# Patient Record
Sex: MALE | Race: WHITE | NOT HISPANIC OR LATINO
[De-identification: names, ages, dates, MRNs, and addresses within clinical notes are randomized per-mention and may not be internally consistent; named-entity substitution may affect disease eponyms.]

---

## 2022-03-29 PROBLEM — Z00.00 ENCOUNTER FOR PREVENTIVE HEALTH EXAMINATION: Status: ACTIVE | Noted: 2022-03-29

## 2022-04-27 ENCOUNTER — NON-APPOINTMENT (OUTPATIENT)
Age: 57
End: 2022-04-27

## 2022-04-27 ENCOUNTER — APPOINTMENT (OUTPATIENT)
Dept: HEART AND VASCULAR | Facility: CLINIC | Age: 57
End: 2022-04-27
Payer: MEDICARE

## 2022-04-27 VITALS
OXYGEN SATURATION: 96 % | BODY MASS INDEX: 22.36 KG/M2 | WEIGHT: 150.99 LBS | SYSTOLIC BLOOD PRESSURE: 135 MMHG | DIASTOLIC BLOOD PRESSURE: 77 MMHG | HEART RATE: 70 BPM | HEIGHT: 69 IN

## 2022-04-27 DIAGNOSIS — Z83.6 FAMILY HISTORY OF OTHER DISEASES OF THE RESPIRATORY SYSTEM: ICD-10-CM

## 2022-04-27 DIAGNOSIS — F17.210 NICOTINE DEPENDENCE, CIGARETTES, UNCOMPLICATED: ICD-10-CM

## 2022-04-27 DIAGNOSIS — Z78.9 OTHER SPECIFIED HEALTH STATUS: ICD-10-CM

## 2022-04-27 PROCEDURE — 99203 OFFICE O/P NEW LOW 30 MIN: CPT

## 2022-04-27 PROCEDURE — 93000 ELECTROCARDIOGRAM COMPLETE: CPT

## 2022-04-27 NOTE — HISTORY OF PRESENT ILLNESS
[FreeTextEntry1] : Pain- Rudy Holland [phone]\par PT- Jag 1\par ENT-\par GI- Dr Rajat Ahn colonoscopy 2019\par PCP- Dr Anand Trejo\par - Dr Fiscehr for PSA of 13\par Pod- lori Mckeon\par Ophtho  Dr Proctor

## 2022-04-27 NOTE — ASSESSMENT
[FreeTextEntry1] : Smoking- Discussed\par \par SOSA- Pt says all testing done at [facility] and Dr Trejo, not clear what was done but I asked pt to sign a release and get me the records.  Otherwise I will be conservative

## 2022-04-27 NOTE — REVIEW OF SYSTEMS
[Hearing Loss] : hearing loss [Vertigo] : vertigo [Negative] : Heme/Lymph [Headache] : headache [FreeTextEntry9] : Back Pain

## 2022-04-27 NOTE — REASON FOR VISIT
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  Labs done with Dr Anand Trejo   Pt notes SOB/SOSA. He has no CP.  " I PACE MYSELF",  very vague. When asked how much he smokes, he replied, "I don't know".  Pt wants to know what preventive things he can do.  \par \par EKG: NSR, normal axis and intervals, no ST-Tw abnormalities. 4/27/22

## 2022-06-10 ENCOUNTER — APPOINTMENT (OUTPATIENT)
Dept: OTOLARYNGOLOGY | Facility: CLINIC | Age: 57
End: 2022-06-10
Payer: MEDICARE

## 2022-06-10 VITALS — WEIGHT: 148 LBS | BODY MASS INDEX: 21.92 KG/M2 | HEIGHT: 69 IN | TEMPERATURE: 98.2 F

## 2022-06-10 DIAGNOSIS — J34.2 DEVIATED NASAL SEPTUM: ICD-10-CM

## 2022-06-10 DIAGNOSIS — J31.0 CHRONIC RHINITIS: ICD-10-CM

## 2022-06-10 PROCEDURE — 31231 NASAL ENDOSCOPY DX: CPT

## 2022-06-10 PROCEDURE — 99204 OFFICE O/P NEW MOD 45 MIN: CPT | Mod: 25

## 2022-06-10 NOTE — CONSULT LETTER
[Dear  ___] : Dear  [unfilled], [Consult Letter:] : I had the pleasure of evaluating your patient, [unfilled]. [Please see my note below.] : Please see my note below. [Consult Closing:] : Thank you very much for allowing me to participate in the care of this patient.  If you have any questions, please do not hesitate to contact me. [Sincerely,] : Sincerely, [FreeTextEntry3] : Cally Velasquez MD\par

## 2022-06-10 NOTE — HISTORY OF PRESENT ILLNESS
[de-identified] : ROME LORENZANA JR is a 56 year old patient with a long history of chronic sinus disease.  He said that he has frequent sinus headaches.  They occur over the midface and forehead.  He also has nasal obstruction and congestion which is worse on the right side.  It is worse during during certain seasons.  If there is pollen or humidity he feels worse.  He is not sure if he gets recurrent infections.  He saw an ENT about 2 months ago.  He said he was given Flonase but does not had improvement.  He is not sure if he has had a CT scan of the sinuses but he may have.\par \par No history of nasal or sinus surgery\par He may have had nasal trauma in the past but is not sure if he has had a fracture\par He does not have a pet but is around dogs.  He has not noticed any worsening symptoms.\par He is a smoker\par He had an MRI of the brain for headaches in 2020.  There was a small mucous retention cyst along the right sphenoid sinus and minimal inflammatory mucosal thickening in left sphenoid sinus per the report.  The remainder of the sinuses were clear

## 2022-06-10 NOTE — ASSESSMENT
[FreeTextEntry1] : He has a long history of chronic rhinosinusitis.  He has bilateral nasal congestion and obstruction.  He also suffers from headaches.  On exam, he has nasal mucosal edema, narrow nasal passages, inferior turbinate hypertrophy and a deviated septum.  He likely also has allergies.  It is unclear if the headaches are due to a sinus disease.  An MRI in 2020 did not show significant sinus findings.\par \par Plan\par -Findings and management options were discussed with the patient.\par -Nasal saline irrigations, nasal steroid spray, and antihistamines as needed.  I gave him Astelin nasal spray to try\par -Allergy evaluation recommended\par -Smoking cessation recommended\par -Neurology follow-up recommended for evaluation for the headaches\par -He could consider nasal procedures to improve his breathing.  However, it is unlikely they will help with the headaches\par -Follow-up after the evaluations.  He is also going to check to see if he has had a CT scan of the sinuses performed.  If he has not, we will send him for a scan\par -Call and return urgently if he has any worsening symptoms or concerns

## 2022-07-29 ENCOUNTER — APPOINTMENT (OUTPATIENT)
Dept: NEUROLOGY | Facility: CLINIC | Age: 57
End: 2022-07-29

## 2022-07-29 VITALS
WEIGHT: 152 LBS | HEIGHT: 69 IN | SYSTOLIC BLOOD PRESSURE: 113 MMHG | TEMPERATURE: 97.8 F | DIASTOLIC BLOOD PRESSURE: 76 MMHG | BODY MASS INDEX: 22.51 KG/M2 | OXYGEN SATURATION: 96 % | HEART RATE: 65 BPM

## 2022-07-29 DIAGNOSIS — Z87.09 PERSONAL HISTORY OF OTHER DISEASES OF THE RESPIRATORY SYSTEM: ICD-10-CM

## 2022-07-29 DIAGNOSIS — G89.29 HEADACHE, UNSPECIFIED: ICD-10-CM

## 2022-07-29 DIAGNOSIS — R51.9 HEADACHE, UNSPECIFIED: ICD-10-CM

## 2022-07-29 PROCEDURE — 99204 OFFICE O/P NEW MOD 45 MIN: CPT

## 2022-07-29 NOTE — ASSESSMENT
[FreeTextEntry1] : Pt is a 55yo M with PMH anxiety who presents for evaluation of chronic headaches x30 years which he describes as "sinus headaches" and dull aching bifrontal pressure. He has never taken any medication including OTC to treat his headaches. Etiologoy of headaches is unclear but likely due to chronic sinusitis/ allergies/ structural nasal issues described by Dr. Velasquez coupled by prior environmental exposures and previous concussion 30 years ago... MRI in 2020 was unremarkable and his headaches have not changed in quality since. He only takes nasal spray and sinus medication which helps. For now I recommend he continue to treat chronic sinus issues with ENT. For his headaches, he should try OTC such as Excedrin or extra strength Tylenol. Discussed strategies for treating chronic headaches- 1) to take his OTC medication at start of headache to ensure max efficacy  2) to avoid known triggers when able 3) Stay well-hydrated. I have advised him to keep a headache diary to better understand headache patterns. This should include details such as alleviating and precipitating factors, associated symptoms, frequency and severity, etc. We will set up followup visit in 1 month to review headache diary and to discuss responsiveness to OTC medications. If OTCs are ineffective he may benefit from triptan/ CGRP. for rescue. No need for preventative as he only gets about 4 headaches per month.

## 2022-07-29 NOTE — PHYSICAL EXAM
[FreeTextEntry1] : Mental status: Unkempt, attention/ concentration is poor- tangential. The patient is alert and oriented x3, naming intact x3, repetition normal. He can follow three-step commands. \par Speech is clear and fluent with good repetition, comprehension, and naming. No evidence of dysarthria.\par Memory is intact: Immediate recall 3 out of 3, short-term 3 out of 3, remote memory intact. \par \par Cranial nerves II through XII intact: \par CN II: Visual fields are full to confrontation.Pupils are 3 mm and briskly reactive to light. \par CN III, IV, VI: At primary gaze, there is no eye deviation. EOMI. No ptosis\par CN V: Facial sensation is intact to pinprick in all 3 divisions bilaterally. \par CN VII: Face is symmetric with normal eye closure and smile.\par CN VII: Hearing is normal to rubbing fingers\par CN IX, X: Palate elevates symmetrically. Phonation is normal.\par CN XI: Head turning and shoulder shrug are intact\par CN XII: Tongue is midline with normal movements and no atrophy.\par \par Motor exam: Upper and lower extremities 5 out of 5 power, normal muscle tone and bulk. No abnormal movements noted.\par Sensory exam: Light touch, vibration, position sense, and pinprick are intact in fingers and toes. Romberg absent.\par Coordination and vestibular exam: Finger to nose intact, no evidence of truncal or appendicular ataxia. No evidence of nystagmus. No vestibular symptoms elicited with head turning during ambulation.\par Gait: ANtalgic gait (L knee pain). Normal stance. Posture is normal. Gait is steady.  \par Reflexes: One to 2+ in upper and lower extremities. No pathological reflexes. Downgoing toes.\par \par \par

## 2022-07-29 NOTE — HISTORY OF PRESENT ILLNESS
[FreeTextEntry1] : Pt is a 56yoM with a hx of of chronic sinus disease, anxiety, no other medical issues, who presents today for initial evaluation of his headaches. He reports a hx of headaches x30 years. They began in 1990 after hitting his head while working- had head CT which was normal. He says that during his career working for [employer] he was exposed to many fumes and gases which he feels further stimulated more headaches. He has had intermittent headaches that he calls sinus headaches. They occur over the midface and forehead. Headaches occur about 4x per month. They usually begin with the feeling of nasal and sinus congestion. Headaches usually begin later in the day. They can last for up to a day. Pain feels like a pressure. Triggers include humidity/ heat, pollen, smell of gasoline, bright lights. Pain feels like a dull pressure. He had an MRI of the brain for headaches in 2020. There was a small mucous retention cyst along the right sphenoid sinus and minimal inflammatory mucosal thickening in left sphenoid sinus per the report. His headaches have not changed in quality or severity since they began 30 years ago. He denies any associated symptoms with his headaches including dizziness/ vision changes/ speech impairment/ focal or generalized weakness/ sensation changes. He has never taken any OTC medications including Excedrin/ Tylenol/ Motrin/ other because "I didn’'t know what I should be taking." \par  \par He is seeking care now because he is now unemployed and has more time to focus on his health. \par \par PMH- denies \par Hx PE/DVT/ other clot- denies \par PSH- denies \par Medications- prilosec, nasal spray, pepcid (previously on multiple medication for anxiety but those were d/c). He does want to take too many medications \par He is a current every day smoker- half a pack a day, for "many years." \par Recreational drug use- denies \par ETOH- denies \par \par

## 2022-10-20 ENCOUNTER — APPOINTMENT (OUTPATIENT)
Dept: NEUROLOGY | Facility: CLINIC | Age: 57
End: 2022-10-20

## 2022-10-20 VITALS
HEIGHT: 69 IN | DIASTOLIC BLOOD PRESSURE: 72 MMHG | TEMPERATURE: 95.6 F | BODY MASS INDEX: 22.96 KG/M2 | SYSTOLIC BLOOD PRESSURE: 119 MMHG | OXYGEN SATURATION: 97 % | HEART RATE: 100 BPM | WEIGHT: 155 LBS

## 2022-10-20 PROCEDURE — 99215 OFFICE O/P EST HI 40 MIN: CPT

## 2022-10-20 NOTE — HISTORY OF PRESENT ILLNESS
[FreeTextEntry1] : The pateint is a 57 year old male with a PMH of anxiety, GERD, BPH, chronic sinusitis, and head trauma. He returns for evaluation of headaches.\par \par The patient was last seen by CHERI Ferrer in July 2022.  At that time, he described chronic headaches that had been unchanged for over 30 years and only occurred 4 times per month.  He attributed the headaches to nasal congestion and pressure.  Given the infrequency, stable nature of the headaches, and normal exam, no further evaluation was recommended.  He was recommended to use over-the-counter analgesics as needed.\par \par Since his follow-up, he states he is now having severe headaches 4 times per week.  In addition, he has less severe headaches on other days.  He describes these headaches as a bitemporal pressure sensation.  Symptoms can last an entire day.  When he gets severe headaches, he becomes nauseous and has sensitivity to light, sound.  He typically needs to closes eyes and rest for relief.  He has been taking Excedrin perhaps twice per week for these headaches without significant relief.  Triggers seem to include certain foods, poor sleep, and stress.  He believes his father may have suffered from migraines.\par \par Otherwise, the patient has a long-standing RUE tremor. He describes chronic imbalance but attributes this to spinal disease. He denies bradykinesia or gait changes otherwise.

## 2022-10-20 NOTE — PHYSICAL EXAM
[FreeTextEntry1] : Alert.  Fully oriented.  Speech and language are intact. Very tangential. Cranial nerves II-XII are intact.  Motor exam reveals intact strength with individual muscle testing in bilateral upper and lower extremities.  R UE rest tremor, intermittent. Tone is normal.  Reflexes are normal.  Sensation is intact to light touch in distal extremities.  Finger-to-nose and heel-to-shin are intact.  Rapid alternating movements are normal in the upper and lower extremities.  Gait is normal. \par

## 2022-10-20 NOTE — ASSESSMENT
[FreeTextEntry1] : The pateint is a 57 year old male with a PMH of anxiety, GERD, BPH, chronic sinusitis, and head trauma. He returns for evaluation of headaches which seem to have worsened since his last visit. Will obtain MRI brain and start nortriptyline at nighttime, gradually increasing dose to 50 mg QHS over several weeks. PRN Imitrex also sent as abortive therapy (no history of stroke, etc). Recommend he continue to use OTC analgesics infrequently. Otherwise, he wishes to monitor tremor for now- could be consistent with parkinsonism - MRI will also eval for underlying structural causes.  RTC 3 months.\par

## 2023-01-04 ENCOUNTER — APPOINTMENT (OUTPATIENT)
Dept: NEUROLOGY | Facility: CLINIC | Age: 58
End: 2023-01-04
Payer: MEDICARE

## 2023-01-04 VITALS
SYSTOLIC BLOOD PRESSURE: 104 MMHG | HEART RATE: 92 BPM | BODY MASS INDEX: 23.4 KG/M2 | OXYGEN SATURATION: 96 % | TEMPERATURE: 97.9 F | WEIGHT: 158 LBS | HEIGHT: 69 IN | DIASTOLIC BLOOD PRESSURE: 74 MMHG

## 2023-01-04 DIAGNOSIS — G43.009 MIGRAINE W/OUT AURA, NOT INTRACTABLE, W/OUT STATUS MIGRAINOSUS: ICD-10-CM

## 2023-01-04 PROCEDURE — 99213 OFFICE O/P EST LOW 20 MIN: CPT

## 2023-01-04 NOTE — HISTORY OF PRESENT ILLNESS
[FreeTextEntry1] : The patient is a 57 year old male with a PMH of anxiety, GERD, BPH, chronic sinusitis, and head trauma. He returns for evaluation of headaches. \par \par Since our last visit, the patient reports frontal "migraines" with associated nausea, photophobia. He takes Excedrin migraine 2x per week with relief. He did not tolerating the nortriptyline. He has not tried Imitrex. His MRI was unremarkable. He denies tremor. He has no new complaints. He is currently undergoing eval with ophthalmology for eye irritation and urology for elevated PSA.

## 2023-01-04 NOTE — PHYSICAL EXAM
[FreeTextEntry1] : Alert. Extremely tangential.Speech/language intact. CN grossly intact. Moving all limbs symmetrically. Gait is normal.\par

## 2023-01-04 NOTE — ASSESSMENT
[FreeTextEntry1] : The patient is a 57 year old male with a PMH of anxiety, GERD, BPH, chronic sinusitis, and head trauma. He returns for evaluation of headaches. His migraines have lessened in frequency since our last visit. He will continue Excedrin sparingly for severe migraines. He also can try Imitrex if needed for refractory headaches.  RTC 6 months.

## 2023-12-14 ENCOUNTER — APPOINTMENT (OUTPATIENT)
Dept: HEART AND VASCULAR | Facility: CLINIC | Age: 58
End: 2023-12-14
Payer: MEDICARE

## 2023-12-14 ENCOUNTER — NON-APPOINTMENT (OUTPATIENT)
Age: 58
End: 2023-12-14

## 2023-12-14 VITALS
OXYGEN SATURATION: 95 % | BODY MASS INDEX: 23.11 KG/M2 | SYSTOLIC BLOOD PRESSURE: 119 MMHG | TEMPERATURE: 98.9 F | DIASTOLIC BLOOD PRESSURE: 76 MMHG | WEIGHT: 156 LBS | HEART RATE: 104 BPM | HEIGHT: 69 IN

## 2023-12-14 PROCEDURE — 99213 OFFICE O/P EST LOW 20 MIN: CPT

## 2023-12-14 PROCEDURE — 93000 ELECTROCARDIOGRAM COMPLETE: CPT

## 2023-12-14 NOTE — HISTORY OF PRESENT ILLNESS
[FreeTextEntry1] : Pain- Rudy Holland [phone] PT- Jag 1 ENT- GI- Dr Rajat Ahn colonoscopy 2019 PCP- Dr Anand Trejo - Dr Fischer for PSA of 13 Pod- lori Mckeon Dr  Sister Maricarmen Killian [phone]

## 2023-12-14 NOTE — REVIEW OF SYSTEMS
[Headache] : headache [Hearing Loss] : hearing loss [Vertigo] : vertigo [Negative] : Heme/Lymph [FreeTextEntry9] : Back Pain

## 2023-12-14 NOTE — REASON FOR VISIT
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  Labs done with Dr Anand Trejo   Pt notes SOB/SOSA. He has no CP.  " I PACE MYSELF",  very vague. When asked how much he smokes, he replied, "I don't know".  Pt wants to know what preventive things he can do.    12/14/23  Saw Dr Cerda for migraine, CT done.  B/L wrist, foot and shoulder pain. Mild wrist, moderate foot and shoulder arthrosis.  Moderate DJD of lumbar spine.  Parents deased, has 1 sister and her family  EKG: NSR, normal axis and intervals, no ST-Tw abnormalities. 4/27/22

## 2023-12-14 NOTE — ASSESSMENT
[FreeTextEntry1] : SOSA- Pt says all testing done at [facility] and Dr Trejo, not clear what was done but I asked pt to sign a release and get me the records. Otherwise I will be conservative. CT 2019, 2020 with CAC, mild emphysema and diffuse airway dz.  No cardiac testing!   CAC on CT from 2019 and 2020, will order a CCS.  Labs were drawn today in Office. Smoking discussed.  Smoking- Discussed

## 2023-12-18 LAB
ALBUMIN SERPL ELPH-MCNC: 4.7 G/DL
ALP BLD-CCNC: 76 U/L
ALT SERPL-CCNC: 10 U/L
ANION GAP SERPL CALC-SCNC: 11 MMOL/L
AST SERPL-CCNC: 17 U/L
BASOPHILS # BLD AUTO: 0.05 K/UL
BASOPHILS NFR BLD AUTO: 0.6 %
BILIRUB SERPL-MCNC: 0.4 MG/DL
BUN SERPL-MCNC: 15 MG/DL
CALCIUM SERPL-MCNC: 10 MG/DL
CHLORIDE SERPL-SCNC: 104 MMOL/L
CHOLEST SERPL-MCNC: 203 MG/DL
CO2 SERPL-SCNC: 25 MMOL/L
CREAT SERPL-MCNC: 0.92 MG/DL
EGFR: 95 ML/MIN/1.73M2
EOSINOPHIL # BLD AUTO: 0.31 K/UL
EOSINOPHIL NFR BLD AUTO: 3.6 %
ESTIMATED AVERAGE GLUCOSE: 123 MG/DL
GLUCOSE SERPL-MCNC: 104 MG/DL
HBA1C MFR BLD HPLC: 5.9 %
HCT VFR BLD CALC: 47 %
HDLC SERPL-MCNC: 45 MG/DL
HGB BLD-MCNC: 15.6 G/DL
IMM GRANULOCYTES NFR BLD AUTO: 0.3 %
LDLC SERPL CALC-MCNC: 116 MG/DL
LYMPHOCYTES # BLD AUTO: 1.98 K/UL
LYMPHOCYTES NFR BLD AUTO: 22.9 %
MAN DIFF?: NORMAL
MCHC RBC-ENTMCNC: 29.7 PG
MCHC RBC-ENTMCNC: 33.2 GM/DL
MCV RBC AUTO: 89.5 FL
MONOCYTES # BLD AUTO: 0.59 K/UL
MONOCYTES NFR BLD AUTO: 6.8 %
NEUTROPHILS # BLD AUTO: 5.69 K/UL
NEUTROPHILS NFR BLD AUTO: 65.8 %
NONHDLC SERPL-MCNC: 158 MG/DL
PLATELET # BLD AUTO: 280 K/UL
POTASSIUM SERPL-SCNC: 4.1 MMOL/L
PROT SERPL-MCNC: 6.9 G/DL
RBC # BLD: 5.25 M/UL
RBC # FLD: 12.5 %
SODIUM SERPL-SCNC: 140 MMOL/L
TRIGL SERPL-MCNC: 243 MG/DL
WBC # FLD AUTO: 8.65 K/UL

## 2023-12-20 ENCOUNTER — OUTPATIENT (OUTPATIENT)
Dept: OUTPATIENT SERVICES | Facility: HOSPITAL | Age: 58
LOS: 1 days | End: 2023-12-20
Payer: SELF-PAY

## 2023-12-20 ENCOUNTER — APPOINTMENT (OUTPATIENT)
Dept: CT IMAGING | Facility: HOSPITAL | Age: 58
End: 2023-12-20

## 2023-12-20 PROCEDURE — 75571 CT HRT W/O DYE W/CA TEST: CPT | Mod: 26

## 2023-12-20 PROCEDURE — 75571 CT HRT W/O DYE W/CA TEST: CPT

## 2024-01-17 ENCOUNTER — APPOINTMENT (OUTPATIENT)
Dept: HEART AND VASCULAR | Facility: CLINIC | Age: 59
End: 2024-01-17
Payer: MEDICARE

## 2024-01-17 VITALS
WEIGHT: 156 LBS | BODY MASS INDEX: 23.11 KG/M2 | SYSTOLIC BLOOD PRESSURE: 116 MMHG | DIASTOLIC BLOOD PRESSURE: 70 MMHG | OXYGEN SATURATION: 99 % | HEIGHT: 69 IN | HEART RATE: 69 BPM | TEMPERATURE: 97.8 F

## 2024-01-17 DIAGNOSIS — R42 DIZZINESS AND GIDDINESS: ICD-10-CM

## 2024-01-17 PROCEDURE — 99214 OFFICE O/P EST MOD 30 MIN: CPT

## 2024-01-17 RX ORDER — MECLIZINE HYDROCHLORIDE 25 MG/1
25 TABLET ORAL
Qty: 90 | Refills: 1 | Status: ACTIVE | COMMUNITY
Start: 2024-01-17

## 2024-01-17 NOTE — ASSESSMENT
[FreeTextEntry1] : SOSA- Pt says all testing done at [facility] [place] and Dr Trejo, not clear what was done but I asked pt to sign a release and get me the records. Otherwise I will be conservative. CT 2019, 2020 with CAC, mild emphysema and diffuse airway dz.  No cardiac testing!   CCS= 534, 95th %ile.   Nuclear stress test recommended (pt agrees) and ASA 81 mg started.   ASHD/ CAC on CT from 2019 and 2020, will order a CCS.  Labs were drawn today in Office. Smoking discussed.  CCS= 534, 95th %ile. Smoking addressed, currently 1/2 PPD.  Smoking- Discussed  Prediabetes- A1c 5.9, diet reviewed  HLD- , Atorvastatin 20 mg started Dec 2023.

## 2024-01-17 NOTE — HISTORY OF PRESENT ILLNESS
[FreeTextEntry1] : Pain- Rudy Holland [phone] PT- Jag 1 ENT- GI- Dr Rajat Ahn colonoscopy 2019 PCP- Dr Anand Trejo - Dr Fischer for PSA of 13 Pod- lori Mckeon Dr  Sister Maricarmen Killian [phone]

## 2024-01-17 NOTE — REASON FOR VISIT
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  Labs done with Dr Anand Trejo   Pt notes SOB/SOSA. He has no CP.  " I PACE MYSELF",  very vague. When asked how much he smokes, he replied, "I don't know".  Pt wants to know what preventive things he can do.    23  Saw Dr Cerda for migraine, CT done.  B/L wrist, foot and shoulder pain. Mild wrist, moderate foot and shoulder arthrosis.  Moderate DJD of lumbar spine.  Parents , has 1 sister and her family  24 A1c 5.9, , CCS = 534, 95th %ile.  EKG: NSR, normal axis and intervals, no ST-Tw abnormalities. 22

## 2024-02-06 ENCOUNTER — APPOINTMENT (OUTPATIENT)
Dept: HEART AND VASCULAR | Facility: CLINIC | Age: 59
End: 2024-02-06
Payer: MEDICARE

## 2024-02-06 PROCEDURE — A9500: CPT

## 2024-02-06 PROCEDURE — 78452 HT MUSCLE IMAGE SPECT MULT: CPT

## 2024-02-06 PROCEDURE — 93015 CV STRESS TEST SUPVJ I&R: CPT

## 2024-02-08 DIAGNOSIS — K76.9 LIVER DISEASE, UNSPECIFIED: ICD-10-CM

## 2024-02-21 ENCOUNTER — APPOINTMENT (OUTPATIENT)
Dept: HEART AND VASCULAR | Facility: CLINIC | Age: 59
End: 2024-02-21
Payer: MEDICARE

## 2024-02-21 DIAGNOSIS — R25.1 TREMOR, UNSPECIFIED: ICD-10-CM

## 2024-02-21 PROCEDURE — 36415 COLL VENOUS BLD VENIPUNCTURE: CPT

## 2024-02-23 LAB — LEAD BLD-MCNC: <1 UG/DL

## 2024-03-05 ENCOUNTER — OUTPATIENT (OUTPATIENT)
Dept: OUTPATIENT SERVICES | Facility: HOSPITAL | Age: 59
LOS: 1 days | End: 2024-03-05
Payer: MEDICARE

## 2024-03-05 ENCOUNTER — APPOINTMENT (OUTPATIENT)
Dept: INTERNAL MEDICINE | Facility: CLINIC | Age: 59
End: 2024-03-05

## 2024-03-05 ENCOUNTER — RESULT REVIEW (OUTPATIENT)
Age: 59
End: 2024-03-05

## 2024-03-05 PROCEDURE — 73630 X-RAY EXAM OF FOOT: CPT | Mod: 26,50

## 2024-03-05 PROCEDURE — 73630 X-RAY EXAM OF FOOT: CPT

## 2024-03-19 ENCOUNTER — APPOINTMENT (OUTPATIENT)
Dept: ORTHOPEDIC SURGERY | Facility: CLINIC | Age: 59
End: 2024-03-19
Payer: MEDICARE

## 2024-03-19 VITALS
HEART RATE: 65 BPM | DIASTOLIC BLOOD PRESSURE: 73 MMHG | SYSTOLIC BLOOD PRESSURE: 111 MMHG | OXYGEN SATURATION: 94 % | WEIGHT: 156 LBS | BODY MASS INDEX: 23.11 KG/M2 | HEIGHT: 69 IN

## 2024-03-19 DIAGNOSIS — Z60.2 PROBLEMS RELATED TO LIVING ALONE: ICD-10-CM

## 2024-03-19 PROCEDURE — 72083 X-RAY EXAM ENTIRE SPI 4/5 VW: CPT

## 2024-03-19 PROCEDURE — 99204 OFFICE O/P NEW MOD 45 MIN: CPT

## 2024-03-19 SDOH — SOCIAL STABILITY - SOCIAL INSECURITY: PROBLEMS RELATED TO LIVING ALONE: Z60.2

## 2024-03-20 RX ORDER — ATORVASTATIN CALCIUM 20 MG/1
20 TABLET, FILM COATED ORAL
Qty: 90 | Refills: 3 | Status: ACTIVE | COMMUNITY
Start: 2023-12-29 | End: 1900-01-01

## 2024-03-21 ENCOUNTER — APPOINTMENT (OUTPATIENT)
Dept: PAIN MANAGEMENT | Facility: CLINIC | Age: 59
End: 2024-03-21

## 2024-03-21 VITALS
HEART RATE: 80 BPM | OXYGEN SATURATION: 97 % | DIASTOLIC BLOOD PRESSURE: 73 MMHG | HEIGHT: 69 IN | WEIGHT: 158 LBS | BODY MASS INDEX: 23.4 KG/M2 | SYSTOLIC BLOOD PRESSURE: 135 MMHG

## 2024-03-21 PROCEDURE — 99205 OFFICE O/P NEW HI 60 MIN: CPT

## 2024-03-21 NOTE — REVIEW OF SYSTEMS
[Urinary Frequency] : urinary frequency [Back Pain] : back pain [Radiating Pain] : radiating pain [Negative] : Respiratory

## 2024-03-21 NOTE — PHYSICAL EXAM
detailed exam [Normal muscle bulk without asymmetry] : normal muscle bulk without asymmetry pupil L/pupil R [Spinous Process Tenderness] : spinous process tenderness [Facet Tenderness] : facet tenderness [Paraspinal Tenderness] : paraspinal tenderness [Normal] : Gait: normal [SLR] : positive straight leg raise [Morel's Test] : positive Morel's Test [LE] : Sensory: Intact in bilateral lower extremities [Patellar] : patellar 2+ and symmetric bilaterally [Achilles] : Achilles 2+ and symmetric bilaterally [Ataxic] : not ataxic [Antalgic] : not antalgic [de-identified] : Positive Slump Test b/l [de-identified] : Limited range of motion of lumbar spine

## 2024-03-21 NOTE — ASSESSMENT
[FreeTextEntry1] : 58 year old male with history of work related injury in February 2005 resulting in chronic left sided lumbar radiculoapthy. Pain is felt on left side of low back with radicular symptoms in L5 distribution. Dr. You referred patient for Reactiv8 procedure. Discussed with patient and he does not wish to proceed at this time.   Plan: - Patient to attend physical therapy. Referral was given to patient for multifidus muscle strengthening.  - Will discuss Reactiv9 again at next visit - Follow up in 6 weeks

## 2024-03-21 NOTE — HISTORY OF PRESENT ILLNESS
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 He was referred by Dr. You to discuss the Reactiv8 procedure. His pain is reported to be on the left side of his back and radiates does the left side of his left leg. He rates the pain as a 6.5/10. The pain is worse with walking, bending, lifting, and activity. The pain is improved with rest. He reports being out of work for years due to his disability. He has tried many treatments including physical therapy, chiropractic care, acupuncture, and multiple epidural injections with continued symptoms. He has tried medications such as Vicodin and Tramadol in the past which he did not tolerate well due to drowsiness.  He reports urinary urgency symptoms due to an enlarged prostate glad, He has been seeing a urologist for care.

## 2024-03-29 ENCOUNTER — APPOINTMENT (OUTPATIENT)
Dept: HEART AND VASCULAR | Facility: CLINIC | Age: 59
End: 2024-03-29
Payer: MEDICARE

## 2024-03-29 VITALS
SYSTOLIC BLOOD PRESSURE: 110 MMHG | HEART RATE: 95 BPM | BODY MASS INDEX: 23.11 KG/M2 | TEMPERATURE: 98.1 F | DIASTOLIC BLOOD PRESSURE: 74 MMHG | WEIGHT: 156 LBS | OXYGEN SATURATION: 98 % | HEIGHT: 69 IN

## 2024-03-29 DIAGNOSIS — F17.200 NICOTINE DEPENDENCE, UNSPECIFIED, UNCOMPLICATED: ICD-10-CM

## 2024-03-29 DIAGNOSIS — R73.03 PREDIABETES.: ICD-10-CM

## 2024-03-29 PROCEDURE — 99213 OFFICE O/P EST LOW 20 MIN: CPT

## 2024-03-29 RX ORDER — ASPIRIN 81 MG/1
81 TABLET, DELAYED RELEASE ORAL
Qty: 90 | Refills: 3 | Status: ACTIVE | COMMUNITY
Start: 2024-03-29

## 2024-03-29 NOTE — PHYSICAL EXAM
[Well Developed] : well developed [Well Nourished] : well nourished [No Acute Distress] : no acute distress [Normal Conjunctiva] : normal conjunctiva [Normal Venous Pressure] : normal venous pressure [Normal S1, S2] : normal S1, S2 [No Carotid Bruit] : no carotid bruit [No Murmur] : no murmur [No Rub] : no rub [No Gallop] : no gallop [Clear Lung Fields] : clear lung fields [Good Air Entry] : good air entry [No Respiratory Distress] : no respiratory distress  [Soft] : abdomen soft [Non Tender] : non-tender [No Masses/organomegaly] : no masses/organomegaly [Normal Bowel Sounds] : normal bowel sounds [Normal Gait] : normal gait [No Edema] : no edema [No Cyanosis] : no cyanosis [No Clubbing] : no clubbing [No Varicosities] : no varicosities [No Skin Lesions] : no skin lesions [No Rash] : no rash [Moves all extremities] : moves all extremities [No Focal Deficits] : no focal deficits [Alert and Oriented] : alert and oriented [Normal Speech] : normal speech [Normal memory] : normal memory

## 2024-03-29 NOTE — HISTORY OF PRESENT ILLNESS
[FreeTextEntry1] : Pain- Rudy Holland [phone] PT- Jag 1 ENT- GI- Dr Rajat Ahn colonoscopy 2019 PCP- Dr Anand Trejo - Dr Fischer for PSA of 13 Pod- lori Mckeon Dr  Sister Maricarmen Killian [phone]

## 2024-03-29 NOTE — ASSESSMENT
[FreeTextEntry1] : SOSA- Pt says all testing done at [facility] and Dr Trejo, not clear what was done but I asked pt to sign a release and get me the records. Otherwise I will be conservative. CT 2019, 2020 with CAC, mild emphysema and diffuse airway dz.  No cardiac testing!   CCS= 534, 95th %ile.   Nuclear stress test recommended (pt agrees) and ASA 81 mg started.   ASHD/ CAC on CT from 2019 and 2020, will order a CCS.  Labs were drawn today in Office. Smoking discussed.  CCS= 534, 95th %ile. Smoking addressed, currently 1/2 PPD.  Smoking- Discussed  Prediabetes- A1c 5.9, diet reviewed  HLD- , Atorvastatin 20 mg started Dec 2023.  Gall Bladder Dz- Getting evaluation elsewhere.

## 2024-03-29 NOTE — REASON FOR VISIT
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  Labs done with Dr Anand Trejo   Pt notes SOB/SOSA. He has no CP.  " I PACE MYSELF",  very vague. When asked how much he smokes, he replied, "I don't know".  Pt wants to know what preventive things he can do.    23  Saw Dr Cerda for migraine, CT done.  B/L wrist, foot and shoulder pain. Mild wrist, moderate foot and shoulder arthrosis.  Moderate DJD of lumbar spine.  Parents , has 1 sister and her family  24 A1c 5.9, , CCS = 534, 95th %ile. 3/29/24  Found to have gall stones, s/p MRCP.  Still Smoking  EKG: NSR, normal axis and intervals, no ST-Tw abnormalities. 22

## 2024-04-02 ENCOUNTER — APPOINTMENT (OUTPATIENT)
Dept: INTERNAL MEDICINE | Facility: CLINIC | Age: 59
End: 2024-04-02

## 2024-04-08 NOTE — PHYSICAL EXAM
[de-identified] : General: patient is well developed, well nourished, in no acute  distress, alert and oriented x 3.   Mood and affect: normal  Respiratory: no respiratory distress noted  Alignment: The spine is well compensated in the coronal and sagittal plane.    Gait: The patient is able to toe walk and heel walk without difficulty.   Palpation: no tenderness to palpation spine or paraspinal region  Range of motion: Lumbar spine ROM is restricted  Neurologic Exam: Motor: Manual Muscle testing in the lower extremities is 5 out of 5 in all muscle groups. There is no evidence of muscular atrophy in the lower extremities. Sensory: Sensation to light touch is grossly intact in the lower extremities  Hip Exam: No pain with internal or external rotation of hips bilaterally  Special tests: Straight leg raise test negative.  Cross straight leg test negative.   [de-identified] : XR full spine ap/lateral, lumbar flexion/extension 3/19/24 (my read): mild multilevel degenerative changes, no spondylolisthesis or dynamic instability, no scoliosis, no acute fractures seen  MRI lumbar spine 7/2022 (my read): multilevel disc bulges and mild bilateral facet arthropathy, no neurocompressive lesion

## 2024-04-08 NOTE — DISCUSSION/SUMMARY
[de-identified] : Discussed my findings with the patient. There is no indication for surgical intervention at this time. Conservative treatment options discussed, including pain management. He will follow up with me as needed. All questions answered.

## 2024-04-23 ENCOUNTER — APPOINTMENT (OUTPATIENT)
Dept: BARIATRICS | Facility: CLINIC | Age: 59
End: 2024-04-23
Payer: MEDICARE

## 2024-04-23 VITALS
HEIGHT: 69 IN | OXYGEN SATURATION: 98 % | BODY MASS INDEX: 23.11 KG/M2 | SYSTOLIC BLOOD PRESSURE: 114 MMHG | WEIGHT: 156 LBS | DIASTOLIC BLOOD PRESSURE: 76 MMHG | TEMPERATURE: 97.8 F | HEART RATE: 67 BPM

## 2024-04-23 DIAGNOSIS — K80.20 CALCULUS OF GALLBLADDER W/OUT CHOLECYSTITIS W/OUT OBSTRUCTION: ICD-10-CM

## 2024-04-23 DIAGNOSIS — N40.0 BENIGN PROSTATIC HYPERPLASIA WITHOUT LOWER URINARY TRACT SYMPMS: ICD-10-CM

## 2024-04-23 DIAGNOSIS — K21.9 GASTRO-ESOPHAGEAL REFLUX DISEASE W/OUT ESOPHAGITIS: ICD-10-CM

## 2024-04-23 PROCEDURE — 99204 OFFICE O/P NEW MOD 45 MIN: CPT

## 2024-04-24 NOTE — PHYSICAL EXAM
[JVD] : no jugular venous distention  [Respiratory Effort] : normal respiratory effort [Normal Rate and Rhythm] : normal rate and rhythm [Alert] : alert [Oriented to Person] : oriented to person [Oriented to Place] : oriented to place [Oriented to Time] : oriented to time [Anxious] : anxious [de-identified] : well, NAD [de-identified] : NCAT [de-identified] : soft, non-tender. no scars.  [de-identified] : no c/c/e [de-identified] : see ROS

## 2024-04-24 NOTE — PLAN
[FreeTextEntry1] : After discussing the risks and benefits of observation versus surgery with the patient he does wish to proceed with surgery.  However as noted above he is a poor historian and throughout the conversation had a lot of perseverating on other topics and flight of ideas.  When I discussed with him who would make medical decisions if he was unable to he identified his sister Maricarmen who is a nurse.  He also reported that he has informed her about the visit today and that he may need gallbladder surgery.  He authorized me to discuss this plan and more details with his sister.  While he does seem to understand the risks and benefits of the surgery and we did discuss in depth the risks including but not limited to infection bleeding bile duct injury bile leak retained stone, need for additional surgery or ERCP, and other potential injuries, at the same time I think it would be beneficial to him for me to loop his sister and prior to finalizing the surgical plan.  In the meantime we will obtain preoperative optimization from his primary care and cardiologist.

## 2024-04-24 NOTE — HISTORY OF PRESENT ILLNESS
[de-identified] : The patient is a 57 y/o man, who is a poor historian. He reports an episode of severe RUQ pain and vomiting, but is unlcear as to whether this was in 2021 or 2022. He reports that he went to an [facility] ED (I don't have access to these records) and was told he had gallstones and needed surgery. However, it was not a good time for him so he took medication instead. He reports having multiple additional imaging studies to follow his NAFLD and that they did also show gallstones. He reports occasional mild RUQ pain brought on by fatty foods and is unable to report how often or how long they last. He denies requiring additional trips to the ED or other medical care he has needed for this. He denies further episodes of vomiting. He denies fevers or chills. He denies episodes where skin or urine/stool change color.  He does report extensive pain history in other locations including back and extremities.

## 2024-04-24 NOTE — ASSESSMENT
[FreeTextEntry1] : Mr. Killian is a 57 y/o man who has symptoms and imaging consistent with biliary colic. We discussed the etiology and natural course of this disease. We reviewed the options for observation vs surgery and the risks and benefits of lap robotic assisted vs open cholecystectomy. We reviewed the expected operative course and recovery. He is very concerned about having another episode with severe pain and vomiting, like he had several years ago.

## 2024-04-24 NOTE — REVIEW OF SYSTEMS
[As Noted in HPI] : as noted in HPI [Negative] : Heme/Lymph [FreeTextEntry5] : sees cardiologist and has recent neg stress test [FreeTextEntry8] : BPH controlled and prior biopsy neg [de-identified] : poor historian +flight of ideas. + perseverance on topics and cannot follow conservation linearly however does ask appropriate questions and can repeat back plan

## 2024-05-14 ENCOUNTER — APPOINTMENT (OUTPATIENT)
Dept: INTERNAL MEDICINE | Facility: CLINIC | Age: 59
End: 2024-05-14

## 2024-05-16 ENCOUNTER — APPOINTMENT (OUTPATIENT)
Dept: PAIN MANAGEMENT | Facility: CLINIC | Age: 59
End: 2024-05-16

## 2024-05-20 ENCOUNTER — NON-APPOINTMENT (OUTPATIENT)
Age: 59
End: 2024-05-20

## 2024-05-20 ENCOUNTER — APPOINTMENT (OUTPATIENT)
Dept: HEART AND VASCULAR | Facility: CLINIC | Age: 59
End: 2024-05-20
Payer: MEDICARE

## 2024-05-20 VITALS
TEMPERATURE: 98 F | DIASTOLIC BLOOD PRESSURE: 80 MMHG | BODY MASS INDEX: 23.4 KG/M2 | HEIGHT: 69 IN | OXYGEN SATURATION: 9 % | SYSTOLIC BLOOD PRESSURE: 128 MMHG | HEART RATE: 71 BPM | WEIGHT: 158 LBS

## 2024-05-20 DIAGNOSIS — Z01.818 ENCOUNTER FOR OTHER PREPROCEDURAL EXAMINATION: ICD-10-CM

## 2024-05-20 DIAGNOSIS — R93.1 ABNORMAL FINDINGS ON DIAGNOSTIC IMAGING OF HEART AND CORONARY CIRCULATION: ICD-10-CM

## 2024-05-20 DIAGNOSIS — E78.00 PURE HYPERCHOLESTEROLEMIA, UNSPECIFIED: ICD-10-CM

## 2024-05-20 PROCEDURE — 99213 OFFICE O/P EST LOW 20 MIN: CPT

## 2024-05-20 PROCEDURE — 36415 COLL VENOUS BLD VENIPUNCTURE: CPT

## 2024-05-20 PROCEDURE — 93000 ELECTROCARDIOGRAM COMPLETE: CPT | Mod: NC

## 2024-05-20 RX ORDER — TAMSULOSIN HYDROCHLORIDE 0.4 MG/1
0.4 CAPSULE ORAL
Refills: 0 | Status: ACTIVE | COMMUNITY

## 2024-05-20 RX ORDER — SUMATRIPTAN 100 MG/1
100 TABLET, FILM COATED ORAL
Qty: 20 | Refills: 5 | Status: DISCONTINUED | COMMUNITY
Start: 2022-10-20 | End: 2024-05-20

## 2024-05-20 RX ORDER — NORTRIPTYLINE HYDROCHLORIDE 10 MG/1
10 CAPSULE ORAL
Qty: 150 | Refills: 3 | Status: DISCONTINUED | COMMUNITY
Start: 2022-10-20 | End: 2024-05-20

## 2024-05-20 RX ORDER — TRETINOIN 0.25 MG/G
0.03 CREAM TOPICAL
Qty: 45 | Refills: 0 | Status: DISCONTINUED | COMMUNITY
Start: 2022-06-01 | End: 2024-05-20

## 2024-05-20 RX ORDER — TAMSULOSIN HYDROCHLORIDE 0.4 MG/1
0.4 CAPSULE ORAL
Qty: 90 | Refills: 3 | Status: DISCONTINUED | COMMUNITY
Start: 2024-01-17 | End: 2024-05-20

## 2024-05-20 RX ORDER — SOD CHLOR,BICARB/SQUEEZ BOTTLE
PACKET, WITH RINSE DEVICE NASAL
Qty: 50 | Refills: 0 | Status: DISCONTINUED | COMMUNITY
Start: 2022-05-11 | End: 2024-05-20

## 2024-05-20 RX ORDER — CHLORHEXIDINE GLUCONATE, 0.12% ORAL RINSE 1.2 MG/ML
0.12 SOLUTION DENTAL
Qty: 473 | Refills: 0 | Status: DISCONTINUED | COMMUNITY
Start: 2022-06-01 | End: 2024-05-20

## 2024-05-20 RX ORDER — AZELASTINE HYDROCHLORIDE 137 UG/1
0.1 SPRAY, METERED NASAL TWICE DAILY
Qty: 1 | Refills: 3 | Status: DISCONTINUED | COMMUNITY
Start: 2022-06-10 | End: 2024-05-20

## 2024-05-20 RX ORDER — MECLIZINE HYDROCHLORIDE 25 MG/1
TABLET ORAL
Refills: 0 | Status: DISCONTINUED | COMMUNITY
End: 2024-05-20

## 2024-05-20 RX ORDER — ASPIRIN 81 MG
81 TABLET, DELAYED RELEASE (ENTERIC COATED) ORAL
Refills: 0 | Status: DISCONTINUED | COMMUNITY
End: 2024-05-20

## 2024-05-20 RX ORDER — FLUTICASONE PROPIONATE 50 UG/1
50 SPRAY, METERED NASAL
Qty: 16 | Refills: 0 | Status: DISCONTINUED | COMMUNITY
Start: 2022-05-11 | End: 2024-05-20

## 2024-05-21 ENCOUNTER — APPOINTMENT (OUTPATIENT)
Dept: PAIN MANAGEMENT | Facility: CLINIC | Age: 59
End: 2024-05-21

## 2024-05-21 VITALS
DIASTOLIC BLOOD PRESSURE: 81 MMHG | SYSTOLIC BLOOD PRESSURE: 122 MMHG | WEIGHT: 150 LBS | OXYGEN SATURATION: 98 % | BODY MASS INDEX: 22.22 KG/M2 | HEIGHT: 69 IN | HEART RATE: 72 BPM

## 2024-05-21 DIAGNOSIS — M51.36 OTHER INTERVERTEBRAL DISC DEGENERATION, LUMBAR REGION: ICD-10-CM

## 2024-05-21 DIAGNOSIS — M54.50 LOW BACK PAIN, UNSPECIFIED: ICD-10-CM

## 2024-05-21 DIAGNOSIS — M62.5A2 MUSCLE WASTING AND ATROPHY, NOT ELSEWHERE CLASSIFIED, BACK, LUMBOSACRAL: ICD-10-CM

## 2024-05-21 DIAGNOSIS — G89.29 LOW BACK PAIN, UNSPECIFIED: ICD-10-CM

## 2024-05-21 PROBLEM — E78.00 PURE HYPERCHOLESTEROLEMIA: Status: ACTIVE | Noted: 2024-01-17

## 2024-05-21 PROBLEM — R93.1 ELEVATED CORONARY ARTERY CALCIUM SCORE: Status: ACTIVE | Noted: 2023-12-14

## 2024-05-21 PROBLEM — Z01.818 PRE-OP EXAM: Status: ACTIVE | Noted: 2024-05-21

## 2024-05-21 PROCEDURE — 99214 OFFICE O/P EST MOD 30 MIN: CPT

## 2024-05-21 NOTE — REASON FOR VISIT
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  Labs done with Dr Anand Trejo   Pt notes SOB/SOSA. He has no CP.  " I PACE MYSELF",  very vague. When asked how much he smokes, he replied, "I don't know".  Pt wants to know what preventive things he can do.    23  Saw Dr Cerda for migraine, CT done.  B/L wrist, foot and shoulder pain. Mild wrist, moderate foot and shoulder arthrosis.  Moderate DJD of lumbar spine.  Parents , has 1 sister and her family 24 A1c 5.9, , CCS = 534, 95th %ile. 3/29/24  Found to have gall stones, s/p MRCP.  Still Smoking 24 getting a cholecystectomy with Dr Thomas on 24. fax [phone] and [phone]. feeling fatigued overall mainly due to physical injuries. feels as though he could climb up 2 flights of stairs without chest pain/pressure. some SOB, attributes to his cigarette smoking.   EKG: NSR, normal axis and intervals, no ST-Tw abnormalities. 22

## 2024-05-21 NOTE — ASSESSMENT
[FreeTextEntry1] : pre op -from a cardiology perspective, cleared for cholecystectomy. able to exert >4.6 METS without sxs. pharm nuc 2/6/24 WNL. plan for medical clearance with Dr Trejo. ekg unchanged.   SOSA- Pt says all testing done at [facility], [place] and Dr Trejo, not clear what was done but I asked pt to sign a release and get me the records. Otherwise I will be conservative. CT 2019, 2020 with CAC, mild emphysema and diffuse airway dz.  No cardiac testing!   CCS= 534, 95th %ile.   Nuclear stress test recommended (pt agrees) and ASA 81 mg started.   ASHD/ CAC on CT from 2019 and 2020, will order a CCS.  Labs were drawn today in Office. Smoking discussed.  CCS= 534, 95th %ile. Smoking addressed, currently 1/2 PPD.  Smoking- Discussed  Prediabetes- A1c 5.9, diet reviewed  HLD- , Atorvastatin 20 mg started Dec 2023.  Gall Bladder Dz- Getting evaluation elsewhere.

## 2024-05-21 NOTE — REVIEW OF SYSTEMS
WDL [Headache] : headache [Hearing Loss] : hearing loss [Vertigo] : vertigo [Negative] : Heme/Lymph [FreeTextEntry9] : Back Pain

## 2024-05-21 NOTE — HISTORY OF PRESENT ILLNESS
[FreeTextEntry1] : Pain- Rudy Holland [phone] PT- [facility] E[facility]- GI- Dr Rajat Ahn colonoscopy 2019 PCP- Dr Anand Trejo - Dr Fischer for PSA of 13 Pod- lori Mckeon Dr  Sister Maricarmen Killian [phone]

## 2024-05-22 PROBLEM — M54.50 CHRONIC LEFT-SIDED LOW BACK PAIN: Status: ACTIVE | Noted: 2024-03-19

## 2024-05-22 PROBLEM — M51.36 DISC DEGENERATION, LUMBAR: Status: ACTIVE | Noted: 2024-03-19

## 2024-05-22 PROBLEM — M62.5A2 MUSCLE WASTING AND ATROPHY, NOT ELSEWHERE CLASSIFIED, BACK, LUMBOSACRAL: Status: ACTIVE | Noted: 2024-03-21

## 2024-05-22 NOTE — ASSESSMENT
[FreeTextEntry1] : 58 year old male with history of work related injury in February 2005 resulting in chronic left sided lumbar radiculoapthy. Pain is felt on left side of low back with radicular symptoms in L5 distribution. Dr. You referred patient for Reactiv8 procedure. Discussed with patient and he does not wish to proceed at this time. During last office visit we recommended he start focused multifidus PT but he hasn't been able to start due to other medical issues that are being worked up  Plan: - Patient will start PT following his upcoming gallbladder surgery and plans to subsequently start PT (New PT script given). He will follow up with us 4-6 weeks after starting PT.

## 2024-05-22 NOTE — HISTORY OF PRESENT ILLNESS
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 He was referred by Dr. You to discuss the Reactiv8 procedure. His pain is reported to be on the left side of his back and radiates does the left side of his left leg. He rates the pain as a 6.5/10. The pain is worse with walking, bending, lifting, and activity. The pain is improved with rest. He reports being out of work for years due to his disability. He has tried many treatments including physical therapy, chiropractic care, acupuncture, and multiple epidural injections with continued symptoms. He has tried medications such as Vicodin and Tramadol in the past which he did not tolerate well due to drowsiness.  He reports urinary urgency symptoms due to an enlarged prostate glad, He has been seeing a urologist for care.   Follow up 5/22/2024: Patient continues to have left low back pain and continues to have difficulty with walking, bending, lifting and activity. We advised him to start multifidus focused PT following last appointment, but he states he was unable to start due to other ongoing medical work ups. He'll be having his gallbladder removed in coming weeks and also has been seeing urology. We discussed restarting PT following this other medical work up to help rebuild strength in low back deep multifidus muscles.  [Back Pain] : back pain [___ yrs] : [unfilled] year(s) ago [Episodic] : episodic [7] : an average pain level of 7/10 [10] : a maximum pain level of 10/10

## 2024-05-22 NOTE — PHYSICAL EXAM
[Normal muscle bulk without asymmetry] : normal muscle bulk without asymmetry [Spinous Process Tenderness] : spinous process tenderness [Facet Tenderness] : facet tenderness [Paraspinal Tenderness] : paraspinal tenderness [Normal] : Gait: normal [Ataxic] : not ataxic [Antalgic] : not antalgic [SLR] : positive straight leg raise [Morel's Test] : positive Morel's Test [LE] : Sensory: Intact in bilateral lower extremities [Patellar] : patellar 2+ and symmetric bilaterally [Achilles] : Achilles 2+ and symmetric bilaterally [de-identified] : Positive Slump Test b/l [de-identified] : Limited range of motion of lumbar spine

## 2024-06-23 ENCOUNTER — TRANSCRIPTION ENCOUNTER (OUTPATIENT)
Age: 59
End: 2024-06-23

## 2024-06-24 ENCOUNTER — TRANSCRIPTION ENCOUNTER (OUTPATIENT)
Age: 59
End: 2024-06-24

## 2024-06-24 ENCOUNTER — APPOINTMENT (OUTPATIENT)
Dept: SURGERY | Facility: HOSPITAL | Age: 59
End: 2024-06-24

## 2024-06-24 ENCOUNTER — OUTPATIENT (OUTPATIENT)
Dept: OUTPATIENT SERVICES | Facility: HOSPITAL | Age: 59
LOS: 1 days | Discharge: HOME | End: 2024-06-24
Payer: MEDICARE

## 2024-06-24 VITALS
DIASTOLIC BLOOD PRESSURE: 70 MMHG | HEART RATE: 60 BPM | RESPIRATION RATE: 16 BRPM | SYSTOLIC BLOOD PRESSURE: 100 MMHG | OXYGEN SATURATION: 97 % | TEMPERATURE: 98 F | HEIGHT: 69 IN | WEIGHT: 159.39 LBS

## 2024-06-24 VITALS — SYSTOLIC BLOOD PRESSURE: 139 MMHG | DIASTOLIC BLOOD PRESSURE: 66 MMHG | OXYGEN SATURATION: 98 % | HEART RATE: 71 BPM

## 2024-06-24 DIAGNOSIS — Z98.890 OTHER SPECIFIED POSTPROCEDURAL STATES: Chronic | ICD-10-CM

## 2024-06-24 PROCEDURE — S2900 ROBOTIC SURGICAL SYSTEM: CPT | Mod: NC,GC

## 2024-06-24 PROCEDURE — C1889: CPT

## 2024-06-24 PROCEDURE — 47563 LAPARO CHOLECYSTECTOMY/GRAPH: CPT

## 2024-06-24 PROCEDURE — 47562 LAPAROSCOPIC CHOLECYSTECTOMY: CPT | Mod: GC

## 2024-06-24 PROCEDURE — 88304 TISSUE EXAM BY PATHOLOGIST: CPT | Mod: 26

## 2024-06-24 PROCEDURE — 88304 TISSUE EXAM BY PATHOLOGIST: CPT

## 2024-06-24 PROCEDURE — S2900: CPT

## 2024-06-24 DEVICE — LIGATING CLIPS WECK HEMOLOK POLYMER MEDIUM-LARGE (GREEN) 6: Type: IMPLANTABLE DEVICE | Status: FUNCTIONAL

## 2024-06-24 RX ORDER — OXYCODONE HYDROCHLORIDE 100 MG/5ML
1 SOLUTION ORAL
Qty: 12 | Refills: 0
Start: 2024-06-24 | End: 2024-06-26

## 2024-06-24 RX ORDER — ATORVASTATIN CALCIUM 20 MG/1
1 TABLET, FILM COATED ORAL
Refills: 0 | DISCHARGE

## 2024-06-24 RX ORDER — ASPIRIN 325 MG/1
0 TABLET, FILM COATED ORAL
Refills: 0 | DISCHARGE

## 2024-06-24 RX ORDER — CEFPODOXIME PROXETIL 50 MG/5 ML
1 SUSPENSION, RECONSTITUTED, ORAL (ML) ORAL
Qty: 8 | Refills: 0
Start: 2024-06-24 | End: 2024-06-27

## 2024-06-24 RX ORDER — OXYCODONE HYDROCHLORIDE 100 MG/5ML
1 SOLUTION ORAL
Qty: 0 | Refills: 0 | DISCHARGE
Start: 2024-06-24

## 2024-06-24 RX ORDER — OXYCODONE HYDROCHLORIDE 100 MG/5ML
5 SOLUTION ORAL ONCE
Refills: 0 | Status: DISCONTINUED | OUTPATIENT
Start: 2024-06-24 | End: 2024-06-24

## 2024-06-24 RX ORDER — ONDANSETRON HYDROCHLORIDE 2 MG/ML
4 INJECTION INTRAMUSCULAR; INTRAVENOUS EVERY 6 HOURS
Refills: 0 | Status: DISCONTINUED | OUTPATIENT
Start: 2024-06-24 | End: 2024-06-24

## 2024-06-24 RX ORDER — ACETAMINOPHEN 325 MG
1000 TABLET ORAL ONCE
Refills: 0 | Status: COMPLETED | OUTPATIENT
Start: 2024-06-24 | End: 2024-06-24

## 2024-06-24 RX ORDER — METRONIDAZOLE 500 MG/1
1 TABLET ORAL
Qty: 8 | Refills: 0
Start: 2024-06-24 | End: 2024-06-27

## 2024-06-24 RX ORDER — TAMSULOSIN HYDROCHLORIDE 0.4 MG/1
1 CAPSULE ORAL
Refills: 0 | DISCHARGE

## 2024-06-24 RX ADMIN — Medication 400 MILLIGRAM(S): at 16:19

## 2024-07-02 PROBLEM — E78.5 HYPERLIPIDEMIA, UNSPECIFIED: Chronic | Status: ACTIVE | Noted: 2024-06-21

## 2024-07-03 PROBLEM — M54.50 LOW BACK PAIN, UNSPECIFIED: Chronic | Status: ACTIVE | Noted: 2024-06-21

## 2024-07-03 PROBLEM — Z87.438 PERSONAL HISTORY OF OTHER DISEASES OF MALE GENITAL ORGANS: Chronic | Status: ACTIVE | Noted: 2024-06-21

## 2024-07-03 PROBLEM — J32.9 CHRONIC SINUSITIS, UNSPECIFIED: Chronic | Status: ACTIVE | Noted: 2024-06-21

## 2024-07-03 PROBLEM — G43.909 MIGRAINE, UNSPECIFIED, NOT INTRACTABLE, WITHOUT STATUS MIGRAINOSUS: Chronic | Status: ACTIVE | Noted: 2024-06-21

## 2024-07-03 LAB — SURGICAL PATHOLOGY STUDY: SIGNIFICANT CHANGE UP

## 2024-07-09 ENCOUNTER — APPOINTMENT (OUTPATIENT)
Dept: BARIATRICS | Facility: CLINIC | Age: 59
End: 2024-07-09
Payer: MEDICARE

## 2024-07-09 VITALS
WEIGHT: 153 LBS | HEIGHT: 69 IN | OXYGEN SATURATION: 97 % | DIASTOLIC BLOOD PRESSURE: 75 MMHG | BODY MASS INDEX: 22.66 KG/M2 | HEART RATE: 81 BPM | SYSTOLIC BLOOD PRESSURE: 110 MMHG | TEMPERATURE: 97.9 F

## 2024-07-09 DIAGNOSIS — Z90.49 ACQUIRED ABSENCE OF OTHER SPECIFIED PARTS OF DIGESTIVE TRACT: ICD-10-CM

## 2024-07-09 PROCEDURE — 99024 POSTOP FOLLOW-UP VISIT: CPT

## 2024-07-16 ENCOUNTER — APPOINTMENT (OUTPATIENT)
Dept: PAIN MANAGEMENT | Facility: CLINIC | Age: 59
End: 2024-07-16
Payer: MEDICARE

## 2024-07-16 VITALS
WEIGHT: 153 LBS | SYSTOLIC BLOOD PRESSURE: 97 MMHG | DIASTOLIC BLOOD PRESSURE: 64 MMHG | BODY MASS INDEX: 22.66 KG/M2 | OXYGEN SATURATION: 99 % | HEIGHT: 69 IN | HEART RATE: 72 BPM

## 2024-07-16 DIAGNOSIS — M54.50 LOW BACK PAIN, UNSPECIFIED: ICD-10-CM

## 2024-07-16 DIAGNOSIS — M51.36 OTHER INTERVERTEBRAL DISC DEGENERATION, LUMBAR REGION: ICD-10-CM

## 2024-07-16 DIAGNOSIS — G89.29 LOW BACK PAIN, UNSPECIFIED: ICD-10-CM

## 2024-07-16 PROCEDURE — 99215 OFFICE O/P EST HI 40 MIN: CPT

## 2024-07-19 ENCOUNTER — APPOINTMENT (OUTPATIENT)
Dept: HEART AND VASCULAR | Facility: CLINIC | Age: 59
End: 2024-07-19
Payer: MEDICARE

## 2024-07-19 VITALS
HEART RATE: 74 BPM | OXYGEN SATURATION: 97 % | SYSTOLIC BLOOD PRESSURE: 112 MMHG | WEIGHT: 150.03 LBS | BODY MASS INDEX: 22.22 KG/M2 | HEIGHT: 69 IN | TEMPERATURE: 98.1 F | DIASTOLIC BLOOD PRESSURE: 71 MMHG

## 2024-07-19 DIAGNOSIS — E78.00 PURE HYPERCHOLESTEROLEMIA, UNSPECIFIED: ICD-10-CM

## 2024-07-19 DIAGNOSIS — R73.03 PREDIABETES.: ICD-10-CM

## 2024-07-19 DIAGNOSIS — R93.1 ABNORMAL FINDINGS ON DIAGNOSTIC IMAGING OF HEART AND CORONARY CIRCULATION: ICD-10-CM

## 2024-07-19 PROCEDURE — 99213 OFFICE O/P EST LOW 20 MIN: CPT

## 2024-07-30 ENCOUNTER — APPOINTMENT (OUTPATIENT)
Dept: INTERNAL MEDICINE | Facility: CLINIC | Age: 59
End: 2024-07-30

## 2024-10-01 ENCOUNTER — APPOINTMENT (OUTPATIENT)
Dept: INTERNAL MEDICINE | Facility: CLINIC | Age: 59
End: 2024-10-01

## 2024-10-29 ENCOUNTER — APPOINTMENT (OUTPATIENT)
Dept: INTERNAL MEDICINE | Facility: CLINIC | Age: 59
End: 2024-10-29

## 2024-12-10 ENCOUNTER — APPOINTMENT (OUTPATIENT)
Dept: INTERNAL MEDICINE | Facility: CLINIC | Age: 59
End: 2024-12-10

## 2024-12-20 ENCOUNTER — NON-APPOINTMENT (OUTPATIENT)
Age: 59
End: 2024-12-20

## 2024-12-20 ENCOUNTER — APPOINTMENT (OUTPATIENT)
Dept: HEART AND VASCULAR | Facility: CLINIC | Age: 59
End: 2024-12-20
Payer: MEDICARE

## 2024-12-20 VITALS
BODY MASS INDEX: 23.25 KG/M2 | OXYGEN SATURATION: 98 % | TEMPERATURE: 98.9 F | DIASTOLIC BLOOD PRESSURE: 78 MMHG | SYSTOLIC BLOOD PRESSURE: 102 MMHG | HEART RATE: 76 BPM | HEIGHT: 69 IN | WEIGHT: 157 LBS

## 2024-12-20 DIAGNOSIS — R73.03 PREDIABETES.: ICD-10-CM

## 2024-12-20 DIAGNOSIS — R93.1 ABNORMAL FINDINGS ON DIAGNOSTIC IMAGING OF HEART AND CORONARY CIRCULATION: ICD-10-CM

## 2024-12-20 DIAGNOSIS — E78.00 PURE HYPERCHOLESTEROLEMIA, UNSPECIFIED: ICD-10-CM

## 2024-12-20 DIAGNOSIS — F17.200 NICOTINE DEPENDENCE, UNSPECIFIED, UNCOMPLICATED: ICD-10-CM

## 2024-12-20 PROCEDURE — 99213 OFFICE O/P EST LOW 20 MIN: CPT

## 2025-02-11 ENCOUNTER — APPOINTMENT (OUTPATIENT)
Dept: INTERNAL MEDICINE | Facility: CLINIC | Age: 60
End: 2025-02-11

## 2025-02-13 ENCOUNTER — OUTPATIENT (OUTPATIENT)
Dept: OUTPATIENT SERVICES | Facility: HOSPITAL | Age: 60
LOS: 1 days | End: 2025-02-13
Payer: MEDICARE

## 2025-02-13 ENCOUNTER — RESULT REVIEW (OUTPATIENT)
Age: 60
End: 2025-02-13

## 2025-02-13 DIAGNOSIS — Z98.890 OTHER SPECIFIED POSTPROCEDURAL STATES: Chronic | ICD-10-CM

## 2025-02-13 PROCEDURE — 73630 X-RAY EXAM OF FOOT: CPT

## 2025-02-13 PROCEDURE — 73630 X-RAY EXAM OF FOOT: CPT | Mod: 26,50

## 2025-04-18 ENCOUNTER — APPOINTMENT (OUTPATIENT)
Dept: HEART AND VASCULAR | Facility: CLINIC | Age: 60
End: 2025-04-18
Payer: MEDICARE

## 2025-04-18 VITALS
HEART RATE: 103 BPM | TEMPERATURE: 98 F | DIASTOLIC BLOOD PRESSURE: 62 MMHG | WEIGHT: 149 LBS | BODY MASS INDEX: 22.07 KG/M2 | SYSTOLIC BLOOD PRESSURE: 102 MMHG | HEIGHT: 69 IN | OXYGEN SATURATION: 97 %

## 2025-04-18 DIAGNOSIS — E78.00 PURE HYPERCHOLESTEROLEMIA, UNSPECIFIED: ICD-10-CM

## 2025-04-18 DIAGNOSIS — R93.1 ABNORMAL FINDINGS ON DIAGNOSTIC IMAGING OF HEART AND CORONARY CIRCULATION: ICD-10-CM

## 2025-04-18 DIAGNOSIS — R73.03 PREDIABETES.: ICD-10-CM

## 2025-04-18 PROCEDURE — 99214 OFFICE O/P EST MOD 30 MIN: CPT

## 2025-04-18 PROCEDURE — 36415 COLL VENOUS BLD VENIPUNCTURE: CPT

## 2025-04-22 LAB
ALBUMIN SERPL ELPH-MCNC: 4.5 G/DL
ALP BLD-CCNC: 83 U/L
ALT SERPL-CCNC: 13 U/L
ANION GAP SERPL CALC-SCNC: 16 MMOL/L
AST SERPL-CCNC: 19 U/L
BILIRUB SERPL-MCNC: 0.6 MG/DL
BUN SERPL-MCNC: 15 MG/DL
CALCIUM SERPL-MCNC: 9.8 MG/DL
CHLORIDE SERPL-SCNC: 102 MMOL/L
CHOLEST SERPL-MCNC: 194 MG/DL
CO2 SERPL-SCNC: 22 MMOL/L
CREAT SERPL-MCNC: 0.86 MG/DL
EGFRCR SERPLBLD CKD-EPI 2021: 99 ML/MIN/1.73M2
ESTIMATED AVERAGE GLUCOSE: 137 MG/DL
GLUCOSE SERPL-MCNC: 111 MG/DL
HBA1C MFR BLD HPLC: 6.4 %
HDLC SERPL-MCNC: 44 MG/DL
LDLC SERPL-MCNC: 112 MG/DL
NONHDLC SERPL-MCNC: 151 MG/DL
POTASSIUM SERPL-SCNC: 4.1 MMOL/L
PROT SERPL-MCNC: 6.8 G/DL
PSA SERPL-MCNC: 14.3 NG/ML
SODIUM SERPL-SCNC: 141 MMOL/L
TRIGL SERPL-MCNC: 223 MG/DL

## 2025-05-12 ENCOUNTER — APPOINTMENT (OUTPATIENT)
Dept: HEART AND VASCULAR | Facility: CLINIC | Age: 60
End: 2025-05-12
Payer: MEDICARE

## 2025-05-12 VITALS
SYSTOLIC BLOOD PRESSURE: 100 MMHG | HEART RATE: 77 BPM | BODY MASS INDEX: 23.25 KG/M2 | OXYGEN SATURATION: 99 % | HEIGHT: 69 IN | DIASTOLIC BLOOD PRESSURE: 64 MMHG | TEMPERATURE: 98.5 F | WEIGHT: 156.99 LBS

## 2025-05-12 DIAGNOSIS — R07.89 OTHER CHEST PAIN: ICD-10-CM

## 2025-05-12 PROCEDURE — 99214 OFFICE O/P EST MOD 30 MIN: CPT

## 2025-06-24 ENCOUNTER — APPOINTMENT (OUTPATIENT)
Dept: HEART AND VASCULAR | Facility: CLINIC | Age: 60
End: 2025-06-24
Payer: MEDICARE

## 2025-06-24 PROCEDURE — 93306 TTE W/DOPPLER COMPLETE: CPT

## 2025-06-25 ENCOUNTER — NON-APPOINTMENT (OUTPATIENT)
Age: 60
End: 2025-06-25

## 2025-06-25 ENCOUNTER — APPOINTMENT (OUTPATIENT)
Dept: UROLOGY | Facility: CLINIC | Age: 60
End: 2025-06-25

## 2025-07-01 ENCOUNTER — APPOINTMENT (OUTPATIENT)
Dept: HEART AND VASCULAR | Facility: CLINIC | Age: 60
End: 2025-07-01
Payer: MEDICARE

## 2025-07-01 VITALS
OXYGEN SATURATION: 97 % | HEIGHT: 69 IN | BODY MASS INDEX: 23.4 KG/M2 | WEIGHT: 157.98 LBS | TEMPERATURE: 98.7 F | DIASTOLIC BLOOD PRESSURE: 54 MMHG | HEART RATE: 69 BPM | SYSTOLIC BLOOD PRESSURE: 90 MMHG

## 2025-07-01 PROCEDURE — 93000 ELECTROCARDIOGRAM COMPLETE: CPT

## 2025-07-01 PROCEDURE — 99214 OFFICE O/P EST MOD 30 MIN: CPT

## 2025-07-31 ENCOUNTER — APPOINTMENT (OUTPATIENT)
Dept: PAIN MANAGEMENT | Facility: CLINIC | Age: 60
End: 2025-07-31

## 2025-07-31 VITALS
HEART RATE: 80 BPM | HEIGHT: 69 IN | WEIGHT: 157 LBS | SYSTOLIC BLOOD PRESSURE: 115 MMHG | BODY MASS INDEX: 23.25 KG/M2 | DIASTOLIC BLOOD PRESSURE: 75 MMHG

## 2025-07-31 DIAGNOSIS — G89.29 LOW BACK PAIN, UNSPECIFIED: ICD-10-CM

## 2025-07-31 DIAGNOSIS — M54.50 LOW BACK PAIN, UNSPECIFIED: ICD-10-CM

## 2025-07-31 DIAGNOSIS — M51.369: ICD-10-CM

## 2025-07-31 PROCEDURE — 99215 OFFICE O/P EST HI 40 MIN: CPT

## 2025-08-12 ENCOUNTER — NON-APPOINTMENT (OUTPATIENT)
Age: 60
End: 2025-08-12

## 2025-08-12 ENCOUNTER — APPOINTMENT (OUTPATIENT)
Dept: OTOLARYNGOLOGY | Facility: CLINIC | Age: 60
End: 2025-08-12
Payer: MEDICARE

## 2025-08-12 DIAGNOSIS — J31.0 CHRONIC RHINITIS: ICD-10-CM

## 2025-08-12 DIAGNOSIS — J34.2 DEVIATED NASAL SEPTUM: ICD-10-CM

## 2025-08-12 DIAGNOSIS — H90.3 SENSORINEURAL HEARING LOSS, BILATERAL: ICD-10-CM

## 2025-08-12 PROCEDURE — 92557 COMPREHENSIVE HEARING TEST: CPT

## 2025-08-12 PROCEDURE — 92567 TYMPANOMETRY: CPT

## 2025-08-12 PROCEDURE — 99203 OFFICE O/P NEW LOW 30 MIN: CPT

## 2025-08-12 RX ORDER — TAMSULOSIN HYDROCHLORIDE 0.4 MG/1
CAPSULE ORAL
Refills: 0 | Status: ACTIVE | COMMUNITY

## 2025-08-12 RX ORDER — FLUTICASONE PROPIONATE 50 UG/1
50 SPRAY NASAL DAILY
Qty: 1 | Refills: 4 | Status: ACTIVE | COMMUNITY
Start: 2025-08-12 | End: 1900-01-01

## 2025-08-21 ENCOUNTER — APPOINTMENT (OUTPATIENT)
Dept: PAIN MANAGEMENT | Facility: CLINIC | Age: 60
End: 2025-08-21

## 2025-08-21 VITALS
OXYGEN SATURATION: 95 % | HEIGHT: 69 IN | WEIGHT: 157 LBS | HEART RATE: 79 BPM | DIASTOLIC BLOOD PRESSURE: 79 MMHG | SYSTOLIC BLOOD PRESSURE: 125 MMHG | BODY MASS INDEX: 23.25 KG/M2

## 2025-08-21 DIAGNOSIS — M54.50 LOW BACK PAIN, UNSPECIFIED: ICD-10-CM

## 2025-08-21 DIAGNOSIS — G89.29 LOW BACK PAIN, UNSPECIFIED: ICD-10-CM

## 2025-08-21 PROCEDURE — 99215 OFFICE O/P EST HI 40 MIN: CPT

## (undated) DEVICE — SUT MONOCRYL 4-0 27" PS-2 UNDYED

## (undated) DEVICE — ENDOCATCH 10MM SPECIMEN POUCH

## (undated) DEVICE — TUBING STRYKER PNEUMOCLEAR HIGH FLOW

## (undated) DEVICE — ELCTR BOVIE PENCIL BLADE 10FT

## (undated) DEVICE — XI ARM SCISSOR ROUND TIP 8MM

## (undated) DEVICE — D HELP - CLEARVIEW CLEARIFY SYSTEM

## (undated) DEVICE — MARKING PEN W RULER

## (undated) DEVICE — DRAPE TOP SHEET 53" X 101"

## (undated) DEVICE — XI ARM CLIP APPLIER MEDIUM-LARGE

## (undated) DEVICE — XI DRAPE COLUMN

## (undated) DEVICE — XI ARM FORCEP CADIERE 8MM

## (undated) DEVICE — Device

## (undated) DEVICE — POSITIONER FOAM EGG CRATE ULNAR 2PCS (PINK)

## (undated) DEVICE — DRSG MASTISOL

## (undated) DEVICE — VENODYNE/SCD SLEEVE CALF MEDIUM

## (undated) DEVICE — PACK GENERAL LAPAROSCOPY

## (undated) DEVICE — NDL GRASPING DISP

## (undated) DEVICE — DRSG STERISTRIPS 0.5 X 4"

## (undated) DEVICE — SUT VICRYL 0 27" UR-6

## (undated) DEVICE — XI ENDOWRIST SUCTION IRRIGATOR 8MM

## (undated) DEVICE — XI TIP COVER

## (undated) DEVICE — XI OBTURATOR OPTICAL BLADELESS 8MM

## (undated) DEVICE — XI DRAPE ARM

## (undated) DEVICE — XI SEAL UNIVERSIAL 5-12MM